# Patient Record
(demographics unavailable — no encounter records)

---

## 2019-01-02 NOTE — EDM.PDOC
ED HPI GENERAL MEDICAL PROBLEM





- General


Chief Complaint: Gastrointestinal Problem


Stated Complaint: NAUSEA,DIZZY,LIGHTHEADED


Time Seen by Provider: 01/02/19 21:55


Source of Information: Reports: Patient


History Limitations: Reports: No Limitations





- History of Present Illness


INITIAL COMMENTS - FREE TEXT/NARRATIVE: 


39-year-old male presents to the ED with acute gastroenteritis symptoms of 

nausea vomiting and diarrhea. This started last evening. 2 children that he 

went to [place] to  after the Christmas break are ill with 

gastroenteritis as well. Drove home over. 20 hours became ill acutely last 

night. Has a headache. Feels very weak and lightheaded when he standing. 

Diarrhea as loose watery he estimates 3-4 diarrhea stools since last night. No 

blood per rectum. He has been vomiting about 8-10 times one of dry heaves. Has 

a taste of blood but did not see any blood coming out. Mild abdominal cramping 

pain. Some pain in the epigastrium radiating through to the back. Feels chilled 

and hot prickly at times. Takes no medications. No previous abdominal surgery.





Onset: Sudden


Onset Date: 01/01/19


Onset Time: 18:00


Duration: Hour(s):


Location: Reports: Abdomen (Acute onset of nausea vomiting and diarrhea.)


Quality: Reports: Other


Severity: Moderate (Headed and dizzy)


Improves with: Reports: None


Worsens with: Reports: None


Context: Denies: Activity, Exercise, Lifting, Sick Contact, Trauma, Other


Associated Symptoms: Denies: No Other Symptoms, Confusion, Chest Pain, cough w 

sputum, Fever/Chills, Loss of Appetite


Treatments PTA: Reports: Other (see below) (Nothing will stay down.)





- Related Data


 Allergies











Allergy/AdvReac Type Severity Reaction Status Date / Time


 


No Known Allergies Allergy   Verified 01/02/19 21:53











Home Meds: 


 Home Meds





Ondansetron [Zofran] 4 mg BUCCAL Q6H PRN #5 tab 01/02/19 [Rx]











Past Medical History





- Past Health History


Medical/Surgical History: Denies Medical/Surgical History


HEENT History: Reports: Other (See Below)


Other HEENT History: dental pain





- Infectious Disease History


Infectious Disease History: Reports: Chicken Pox





Social & Family History





- Family History


Family Medical History: Noncontributory





- Caffeine Use


Caffeine Use: Reports: None





- Living Situation & Occupation


Living situation: Reports:  (getting a divorce), Alone (His children are 

with cement present.)


Occupation: Employed ([employer])





ED ROS GENERAL





- Review of Systems


Review Of Systems: See Below


Constitutional: Reports: Fever, Chills, Malaise, Weakness, Other


HEENT: Reports: Other (Dry mouth)


Respiratory: Reports: No Symptoms


Cardiovascular: Reports: No Symptoms


Endocrine: Reports: Fatigue


GI/Abdominal: Reports: Abdominal Pain (Mostly pressure discomfort in the 

epigastrium.), Diarrhea, Nausea (David high-volume watery stools 3 or 4 since 

last night.), Vomiting.  Denies: Hematemesis, Hematochezia


: Reports: No Symptoms


Musculoskeletal: Reports: Muscle Pain


Skin: Reports: No Symptoms (Generalized myalgia)


Neurological: Reports: No Symptoms


Psychiatric: Reports: No Symptoms


Hematologic/Lymphatic: Reports: No Symptoms


Immunologic: Reports: No Symptoms





ED EXAM, GI/ABD





- Physical Exam


Exam: See Below


Exam Limited By: No Limitations


General Appearance: Alert, WD/WN, No Apparent Distress


Eyes: Bilateral: Normal Appearance (No jaundice)


Throat/Mouth: Normal Inspection, Normal Lips, Normal Teeth, Other (Tongue is 

mildly dry and coated)


Head: Atraumatic, Normocephalic


Neck: Normal Inspection, Supple, Non-Tender, Full Range of Motion.  No: 

Lymphadenopathy (L), Lymphadenopathy (R)


Respiratory/Chest: No Respiratory Distress, Lungs Clear, Normal Breath Sounds, 

No Accessory Muscle Use


Cardiovascular: Regular Rate, Rhythm, No Edema (Resting tachycardia 114 per 

minute.), No Gallop, No Murmur, No Rub, Tachycardia, Other (He has borderline 

orthostatic changes.)


GI/Abdominal Exam: Soft, Non-Tender, No Organomegaly, Tender (3 mildly tender 

in the epigastrium believed to be muscular in origin from vomiting), Abnormal 

Bowel Sounds (Decreased bowel sounds from the norm.).  No: Guarding, Rigid, 

Rebound


Back Exam: Normal Inspection, Full Range of Motion.  No: CVA Tenderness (L), 

CVA Tenderness (R)


Extremities: Normal Inspection, Normal Range of Motion, Non-Tender, No Pedal 

Edema


Neurological: Alert, Oriented, CN II-XII Intact, Normal Cognition


Psychiatric: Normal Affect, Normal Mood


Skin Exam: Warm, Dry, Intact, Normal Color, No Rash





Course





- Vital Signs


Last Recorded V/S: 


 Last Vital Signs











Temp  37.1 C   01/02/19 21:50


 


Pulse  113 H  01/02/19 21:50


 


Resp  18   01/02/19 21:50


 


BP  158/104 H  01/02/19 21:50


 


Pulse Ox  98   01/02/19 21:50








 





Orthostatic Blood Pressure [     140/108


Standing]                        


Orthostatic Blood Pressure [     161/98


Sitting]                         


Orthostatic Blood Pressure [     154/92


Supine]                          











- Orders/Labs/Meds


Orders: 


 Active Orders 24 hr











 Category Date Time Status


 


 Orthostatic Vital Signs [RC] ASDIRECTED Care  01/02/19 21:55 Active











Labs: 


 Laboratory Tests











  01/02/19 01/02/19 Range/Units





  22:14 22:14 


 


WBC  9.13 H   (4.23-9.07)  K/mm3


 


RBC  4.52 L   (4.63-6.08)  M/mm3


 


Hgb  14.8   (13.7-17.5)  gm/L


 


Hct  42.6   (40.1-51.0)  %


 


MCV  94.2 H   (79.0-92.2)  fl


 


MCH  32.7 H   (25.7-32.2)  pg


 


MCHC  34.7   (32.2-35.5)  g/dl


 


RDW Std Deviation  45.6 H   (35.1-43.9)  fL


 


Plt Count  268   (163-337)  K/mm3


 


MPV  11.0   (9.4-12.3)  fl


 


Neut % (Auto)  55.4   (34.0-67.9)  %


 


Lymph % (Auto)  34.7   (21.8-53.1)  %


 


Mono % (Auto)  6.5   (5.3-12.2)  %


 


Eos % (Auto)  3.1   (0.8-7.0)  


 


Baso % (Auto)  0.1   (0.1-1.2)  %


 


Neut # (Auto)  5.06   (1.78-5.38)  K/mm3


 


Lymph # (Auto)  3.17   (1.32-3.57)  K/mm3


 


Mono # (Auto)  0.59   (0.30-0.82)  K/mm3


 


Eos # (Auto)  0.28   (0.04-0.54)  K/mm3


 


Baso # (Auto)  0.01   (0.01-0.08)  K/mm3


 


Sodium   141  (136-145)  mEq/L


 


Potassium   3.6  (3.5-5.1)  mEq/L


 


Chloride   103  ()  mEq/L


 


Carbon Dioxide   26  (21-32)  mEq/L


 


Anion Gap   15.6 H  (5-15)  


 


BUN   23 H  (7-18)  mg/dL


 


Creatinine   1.4 H  (0.7-1.3)  mg/dL


 


Est Cr Clr Drug Dosing   70.84  mL/min


 


Estimated GFR (MDRD)   56  (>60)  mL/min


 


BUN/Creatinine Ratio   16.4  (14-18)  


 


Glucose   130 H  ()  mg/dL


 


Calcium   8.3 L  (8.5-10.1)  mg/dL


 


Total Bilirubin   0.2  (0.2-1.0)  mg/dL


 


AST   30  (15-37)  U/L


 


ALT   69 H  (16-63)  U/L


 


Alkaline Phosphatase   60  ()  U/L


 


C-Reactive Protein   < 0.2  (<1.0)  mg/dL


 


Total Protein   7.1  (6.4-8.2)  g/dl


 


Albumin   3.8  (3.4-5.0)  g/dl


 


Globulin   3.3  gm/dL


 


Albumin/Globulin Ratio   1.2  (1-2)  











Meds: 


Medications














Discontinued Medications














Generic Name Dose Route Start Last Admin





  Trade Name Vadimq  PRN Reason Stop Dose Admin


 


Diphenhydramine HCl  25 mg  01/02/19 22:00  01/02/19 22:11





  Benadryl  IVPUSH  01/02/19 22:01  25 mg





  ONETIME ONE   Administration





     





     





     





     


 


Dextrose/Lactated Ringer's  1,000 mls @ 999 mls/hr  01/02/19 22:00  01/02/19 22:

08





  Dextrose 5%-Lactated Ringers  IV   999 mls/hr





  ASDIRECTED EDITH   Administration





     





     





     





     


 


Ketorolac Tromethamine  30 mg  01/02/19 22:00  01/02/19 22:12





  Toradol  IVPUSH   30 mg





  ONETIME EDITH   Administration





     





     





     





     


 


Metoclopramide HCl  10 mg  01/02/19 22:00  01/02/19 22:10





  Reglan  IVPUSH  01/02/19 22:01  10 mg





  ONETIME ONE   Administration





     





     





     





     














- Radiology Interpretation


Free Text/Narrative:: 


39-year-old male presents the ED with acute onset of gastroenteritis symptoms 

with nausea vomiting and diarrhea since last evening. Nothing stay down all day 

today. He takes no medications on a regular basis. Clinically he is mildly 

volume depleted. He is mildly orthostatic. Plan D5 Ringer's lactate at open. 

Routine labs to be collected. He'll be given Reglan 10 mg IV with Benadryl 25 

mg IV and Toradol 30 mg IV for headache and body ache relief.








- Re-Assessments/Exams


Free Text/Narrative Re-Assessment/Exam: 





01/02/19 22:40 Hematology is back. White count is 9.13 with 55% neutrophils on 

automated differential. Hematocrit is 42.6 hemoglobin is 14.8 platelet count 

normal 268,000.





01/02/19 23:27 Labs are back with a total white count of 9.13 with 55% 

neutrophils on automated differential. Hemoglobin is 14.8 with hematocrit of 

42.6. MCV is mildly elevated at 94.2. Platelet count is normal 268,000. Sodium 

is 141 with potassium of 3.6. Chloride 13 with a bicarbonate of 26. And a gap 

is 15.6 only minimally elevated. BUN is 23 with a creatinine of 1.4. GFR is 56. 

Glucose 1:30. Calcium 8.3. Bilirubin 0.2. AST is 30 with an ALT of 69. Alkaline 

phosphatase normal at 60.





01/02/19 23:35: Patient is feeling better. Still has a bit of a headache. Will 

be discharged home with Zofran 4 mg sublingual to be used every 6 hours. For 

nausea or vomiting relief 5 tablets. Clear fluids. Note given to excuse him 

from work today and tomorrow.








Departure





- Departure


Time of Disposition: 23:34


Disposition: Home, Self-Care 01


Condition: Fair


Clinical Impression: 


 Viral gastroenteritis








- Discharge Information


*PRESCRIPTION DRUG MONITORING PROGRAM REVIEWED*: Not Applicable


*COPY OF PRESCRIPTION DRUG MONITORING REPORT IN PATIENT LISETH: Not Applicable


Prescriptions: 


Ondansetron [Zofran] 4 mg BUCCAL Q6H PRN #5 tab


 PRN Reason: nausea or vomiting


Instructions:  Viral Gastroenteritis, Adult


Referrals: 


PCP,None [Primary Care Provider] - 


Forms:  ED Department Discharge, ED Return to Work/School Form


Additional Instructions: 


Evaluation the emergent today in regards to the development of stomach flu or 

viral gastroenteritis with acute onset of nausea vomiting and watery diarrhea. 

This appears to been transmitted from to your children who have been ill with a 

similar type illness. Did produce some degree of dehydration which was treated 

with IV fluid replacements. You're given Reglan 10 mg IV for nausea relief. 

Toradol 30 mg IV for headache and body ache. Treatment at home is since of 

clear fluids such as Gatorade or Powerade ideally 5 or 6 ounces sipped per hour 

to maintain hydration. Tolerated may advance diet to crackers if tolerated then 

go to toast with some jam. Then advance to soup broth-like turkey rice/chicken 

noodle etc. Avoid all dairy products and no apple or grape juice until stools 

are formed back up. I did write a prescription for Zofran 4 mg a be taken under 

your tongue every 4-6 hours necessary for relief of nausea or vomiting. Given 

to excuse her from work today and tomorrow due to current illness.





- My Orders


Last 24 Hours: 


My Active Orders





01/02/19 21:55


Orthostatic Vital Signs [RC] ASDIRECTED 














- Assessment/Plan


Last 24 Hours: 


My Active Orders





01/02/19 21:55


Orthostatic Vital Signs [RC] ASDIRECTED

## 2019-01-21 NOTE — EDM.PDOC
ED HPI GENERAL MEDICAL PROBLEM





- General


Chief Complaint: Respiratory Problem


Stated Complaint: COUGH CONGESTION


Time Seen by Provider: 01/21/19 22:34


Source of Information: Reports: Patient, RN Notes Reviewed





- History of Present Illness


INITIAL COMMENTS - FREE TEXT/NARRATIVE: 





39-year-old male comes in with cough, sore throat, nasal sinus congestion and 

loss of voice for about the last 3 days. He states he first did become ill with 

a cough and congestion about a week ago. He has had some chills, possible low-

grade fever. 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 

working outdoors in the cold. Cough is now occasionally productive of colored 

phlegm. He is concerned about getting pneumonia, frustrated about not getting 

better.


  ** Chest


Pain Score (Numeric/FACES): 5





- Related Data


 Allergies











Allergy/AdvReac Type Severity Reaction Status Date / Time


 


No Known Allergies Allergy   Verified 01/21/19 22:30











Home Meds: 


 Home Meds





Ondansetron [Zofran] 4 mg BUCCAL Q6H PRN #5 tab 01/02/19 [Rx]











Past Medical History





- Past Health History


Medical/Surgical History: Denies Medical/Surgical History


HEENT History: Reports: Other (See Below)


Other HEENT History: dental pain


Psychiatric History: Reports: Addiction


Other Psychiatric History: currently on drug patch





- Infectious Disease History


Infectious Disease History: Reports: Chicken Pox





Social & Family History





- Family History


Family Medical History: Noncontributory





- Tobacco Use


Smoking Status *Q: Current Every Day Smoker


Years of Tobacco use: 8


Packs/Tins Daily: 0.5





- Caffeine Use


Caffeine Use: Reports: Tea





- Recreational Drug Use


Recreational Drug Use: No





- Living Situation & Occupation


Living situation: Reports:  (getting a divorce), Alone (His children are 

with cement present.)


Occupation: Employed ([employer])





ED ROS GENERAL





- Review of Systems


Review Of Systems: See Below


Constitutional: Reports: Fever (Low-grade), Chills


HEENT: Reports: Rhinitis, Throat Pain, Other (Has lost voice the last few days)


Respiratory: Reports: Cough, Sputum.  Denies: Shortness of Breath, Wheezing, 

Pleuritic Chest Pain


Cardiovascular: Reports: Chest Pain (With coughing)


GI/Abdominal: Denies: Nausea, Vomiting


Musculoskeletal: Reports: No Symptoms


Skin: Reports: No Symptoms


Neurological: Reports: Headache (No better)





ED EXAM, GENERAL





- Physical Exam


Exam: See Below


General Appearance: Alert, Mild Distress


Eye Exam: Bilateral Eye: PERRL


Throat/Mouth: Normal Inspection, Normal Oropharynx


Head: Atraumatic


Neck: Supple, Full Range of Motion


Respiratory/Chest: No Respiratory Distress, Lungs Clear, Normal Breath Sounds.  

No: Rhonchi, Wheezing


Cardiovascular: Regular Rate, Rhythm


Extremities: Normal Inspection


Neurological: Alert, Oriented, No Motor/Sensory Deficits


Skin Exam: Warm, Dry, Normal Color





Course





- Vital Signs


Last Recorded V/S: 


 Last Vital Signs











Temp  98.0 F   01/21/19 22:28


 


Pulse  70   01/21/19 22:28


 


Resp  16   01/21/19 22:28


 


BP  145/110 H  01/21/19 22:28


 


Pulse Ox  97   01/21/19 22:28














- Orders/Labs/Meds


Meds: 


Medications














Discontinued Medications














Generic Name Dose Route Start Last Admin





  Trade Name Vadimq  PRN Reason Stop Dose Admin


 


Azithromycin  500 mg  01/21/19 22:51  01/21/19 22:53





  Zithromax  PO  01/21/19 22:52  500 mg





  ONETIME ONE   Administration





     





     





     





     














Departure





- Departure


Time of Disposition: 22:51


Disposition: Home, Self-Care 01


Condition: Fair


Clinical Impression: 


 Bronchitis, Laryngitis








- Discharge Information


Instructions:  Acute Bronchitis, Adult


Referrals: 


PCP,None [Primary Care Provider] - 


Forms:  ED Department Discharge, ED Return to Work/School Form


Additional Instructions: 


Rest, vaporizer or steam as needed, Zithromax antibiotic as prescribed. You 

have been given 500 mg orally tonight. Take 500 mg tomorrow and then continue 1 

tablet or 250 mg daily for the next 4 days. You may use over-the-counter cough 

medication as needed. Tylenol or ibuprofen as needed for discomfort. Follow-up 

clinic if not much better within 5-7 days as expected.